# Patient Record
Sex: FEMALE | Race: WHITE | Employment: UNEMPLOYED | URBAN - METROPOLITAN AREA
[De-identification: names, ages, dates, MRNs, and addresses within clinical notes are randomized per-mention and may not be internally consistent; named-entity substitution may affect disease eponyms.]

---

## 2022-10-27 ENCOUNTER — HOSPITAL ENCOUNTER (OUTPATIENT)
Dept: PHYSICAL THERAPY | Age: 37
Discharge: HOME | End: 2022-10-27
Payer: OTHER GOVERNMENT

## 2022-10-27 PROCEDURE — 97162 PT EVAL MOD COMPLEX 30 MIN: CPT

## 2022-10-27 PROCEDURE — 97535 SELF CARE MNGMENT TRAINING: CPT

## 2022-10-27 PROCEDURE — 97530 THERAPEUTIC ACTIVITIES: CPT

## 2022-10-27 NOTE — PROGRESS NOTES
[facility] at the [address], [place], [address] [facility]  Phone: [phone]      Fax:  [phone]      Plan of Care/ Statement of Necessity for Physical Therapy Services      Patient name: Anaay English Start of Care: 10/27/2022   Referral source: Kari Fortune DO : 1985    Medical Diagnosis: Other symptoms and signs involving the genitourinary system [R39.89]   Onset Date:10/27/2012    Treatment Diagnosis:  Other symptoms and signs involving the genitourinary system [R39.89]   Prior Hospitalization: see medical history Provider#: [id number]   Medications: Verified on Patient summary List    Comorbidities: Pelvic organ prolapse, 4 vaginal deliveries   Prior Level of Function: functionally independent, no pain with sex, no difficulty with bowel movements, normal urinary urges      The Plan of Care and following information is based on the information from the initial evaluation. Assessment/ key information:  Patient is a 30year old female presenting to Physical Therapy with c/o urinary retention/decreased urinary urges, difficulty with defecation, perineal/pelvic pain, and tailbone pain which is limiting ability function at previous level. Patient has perinal pain complaints with long periods of standing/walking and pelvic pain with sexual intercourse. She has been diagnosed with pelvic organ prolapse (potentially bladder prolapse and rectocele, per patient) and was not adherent to pessary. Due to increased time to take thorough history and symptoms, an internal assessment is deferred until next visit. Patient presents with poor understanding of bladder and bowel anatomy/function and pelvic health. I feel patient would benefit from skilled therapeutic intervention to optimize highest functional level possible.    Evaluation Complexity History HIGH Complexity :3+ comorbidities / personal factors will impact the outcome/ POC ; Examination MEDIUM Complexity : 3 Standardized tests and measures addressing body structure, function, activity limitation and / or participation in recreation  ;Presentation MEDIUM Complexity : Evolving with changing characteristics  ; Clinical Decision Making MEDIUM Complexity : FOTO score of 26-74 FOTO score = an established functional score where 100 = no disability  Overall Complexity Rating: MEDIUM  Problem List: pain affecting function, decrease ROM, decrease strength, impaired gait/ balance, decrease ADL/ functional abilitiies, decrease activity tolerance, decrease flexibility/ joint mobility, decrease transfer abilities, and other pelvic pain and pressure affecting function    Treatment Plan may include any combination of the following: Therapeutic exercise, Neuromuscular reeducation, Manual therapy, Therapeutic activity, Self care/home management, and Other: pelvic floor physical therapy    Patient / Family readiness to learn indicated by: asking questions and interest  Persons(s) to be included in education: patient (P)  Barriers to Learning/Limitations: yes;  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 Status at eval: Patient consuming 80 oz water daily. Reports no urinary urges until seated on toilet and voiding, voiding about every 3 hours without urge because she thinks she should. 2) Bowel: Pt will demonstrate proper toileting posture/techniques for improved bowel motility to improve QOL and prevent weakening of PFM due to straining with BMs.   Status at eval: BM every day, Type 3-4, standard toileting posture with feet on stool, pain with BM and splinting every 1/7 days     3) Pain Patient will be able to use management tools/exercises to reduce pelvic pain/pressure following walk/exercise/childcare to less than VAS 3/10  Status at eval: being on feet and during transitions cause pelvic pain and tailbone pain 0-7/10     Long term goals: To be achieved in 16 weeks:  1) Urinary: Patient will report appropriate urinary urges every 3-4 hours with strong stream of 8-12 seconds on voiding based on fluid intake. Status at eval: Reports no urinary urges until seated on toilet and voiding, voiding about every 3 hours without urge because she thinks she should. 2) Pain: Patient will report ability to have sexual intercourse with  without limitation of positions from pain for emotional health and wellbeing. Status at eval: Patient reports modified sexual intercourse positions due to pelvic pain     3) Pain: Patient will report 0/10 tailbone pain with sit to stand transfers over 2-3 weeks for improved participation in childcare.               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 Status at eval : patient unsure of independent symptom management- no longer using pessary  Frequency / Duration: Patient to be seen 1 times per week for 16 weeks. Patient/ Caregiver education and instruction: Diagnosis, prognosis, self care, activity modification, and exercises   [x]  Plan of care has been reviewed with JER Durant, MATTHEW 10/27/2022 8:07 AM  _____________________________________________________________________  I certify that the above Therapy Services are being furnished while the patient is under my care. I agree with the treatment plan and certify that this therapy is necessary.     [de-identified] Signature:____________Date:_________TIME:________                                      Subha Sharif, DO    ** Signature, Date and Time must be completed for valid certification **    Please sign and return to [facility] at the [address], [address] [facility]       Phone: [phone]      Fax:  [phone]

## 2022-10-27 NOTE — PROGRESS NOTES
Physical Therapy Evaluation        Patient Name: Nadir Mack  Date:10/27/2022  : 1985  [x]  Patient  Verified  Payor:  / Plan: [employer] / Product Type:  /    In time:10:36  Out time:11:18  Total Treatment Time (min): 42  Visit #: 1 of 16    Medicare/BCBS Only   Total Timed Codes (min):  20 1:1 Treatment Time:  42       Treatment Area: Other symptoms and signs involving the genitourinary system [R39.89]    SUBJECTIVE  ny medication changes, allergies to medications, adverse drug reactions, diagnosis change, or new procedure performed?: [x] No    [] Yes (see summary sheet for update)  Subjective functional status/changes:     Current symptoms/Complaints: discomfort/difficulty with bowel symptoms requiring splinting and straining, pain with sexual intercourse and changing positions, decreased sensation to void/decreased urge  \"prolapse symptoms\"  Mechanism of Injury: after second (of 4) pregnanices; diagnosed with rectocele and cystocele/blader prolapse 2 children ago    PLOF: functionally independent, no pain with sex, no difficulty with bowel movements, normal urinary urges  Limitations to PLOF: pain with sex and has modified positions, splinting/ pain/ straining with bowel movements, pelvic/ perineal pain/pressure after standing/walking long periods of time, symptoms worse with cycle, tailbone pain with transitions, decreased urinary urge with worry for increased UTIs    Pain Location:   Pain Level (0-10 scale):  \"Tailbone pain\" insidiously, a few times a month and lasts a few days  []constant [x]intermittent []improving []worsening []no change since onset  Current: 0/10  Best: 0/10   Worst: 6-7/10 during when tailbone hurts with transitioning positions  Sleep: is not interrupted secondary to pain    Previous Treatment/Compliance: Tried pessary a few years ago and didn't like it, but open to it again due to using menstrual cup  Obstetrical History:5 pregnancies/ 4 children; all vaginal deliveries, possible tearing (2010- 2017)  PMHx/Surgical Hx: None  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  Living Situation:  and 4 children  Pt Goals: \"I don't know- less pain, improved bladder urges, improved sexual relations\"  Barriers: []pain []financial [x]time []transportation [x]Other: Mom of 4  Motivation: moderate  Substance use: []Alcohol []Tobacco []other:   Cognition: A & O x 3      Current urinary complaint  sensation of incomplete bladder emptying intermittently    Bladder complaint longevity:   10ish years    Bladder symptom progression:  Remaining the same    Pad use: none- no need    Pad wetness when changed: none    Daytime urinary frequency:  Every 3-4 hour(s) during the day    Nocturia:  0x/ night    Patient has failed previous pelvic floor muscle training? [] Yes    [x] No    Bowel function:  Some constipation and splinting, daily  Stool Type ([place]): 3-4    Toileting position/modifications: uses stool    Fecal Incontinence present? None    Diet: Average    Fluid intake:    Fluid  Amount per day   Water 80oz   Caffeine 1 bottle/1 can per day of Dr. Lashawn Conroy   Alcohol None             Physical Exam Objective Findings:    Pelvic Floor Assessment  Patient was educated on pelvic floor anatomy, structure, and function and implications for current presentation of s/s. Interna assessment was held today due to time to discuss extensive symptoms and history. 22 min [x]Eval                  []Re-Eval       10 min Self Care: Review and handout provided on the following: PFM anatomy, structure and function as it pertains to current s/s, complaints and condition. Reviewed expectations for PFPT and POC. Rationale:  Increase awareness and understanding of current condition to improve patients ability to independently and effectively attain goals and progress towards long term management of current condition.      10 min Therapeutic Activity:  [x]  See flow sheet : Education and handouts provided for bladder fitness, voiding schedule every 2 1/2 hours, toileting positions to improve bowel movements   Rationale: increase strength, improve coordination, and increase proprioception  to improve the patients ability to improve bladder/bowel fitness for health      Pain Level (0-10 scale) post treatment: 0    ASSESSMENT/Changes in Function: Patient is a 30year old female presenting to Physical Therapy with c/o urinary retention, difficulty with defecation, perineal/pelvic pain, and tailbone pain which is limiting ability function at previous level. Patient has perinal pain complaints with long periods of standing/walking and pelvic pain with sexual intercourse. She has been diagnosed with pelvic organ prolapse (potentially bladder prolapse and rectocele, per patient) and was not adherent to pessary. Due to increased time to take thorough history and symptoms, an internal assessment is deferred until next visit. Patient presents with poor understanding of bladder and bowel anatomy/function and pelvic health. I feel patient would benefit from skilled therapeutic intervention to optimize highest functional level possible. Patient will continue to benefit from skilled PT services to modify and progress therapeutic interventions, address functional mobility deficits, address ROM deficits, address strength deficits, analyze and address soft tissue restrictions, analyze and cue movement patterns, analyze and modify body mechanics/ergonomics, assess and modify postural abnormalities, address imbalance/dizziness, and instruct in home and community integration to attain remaining goals. [x]  See Plan of Care  []  See progress note/recertification  []  See Discharge Summary         Progress towards goals / Updated goals:  Short term goals:  To be achieved in 8 weeks:  1) Urinary: Patient will demonstrate proper fluid habits and normal voiding strategies that promote bladder health to aid in management of normalizing urinary urges and decrease risk of UTIs/ complications of bladder retention. Status at eval: Patient consuming 80 oz water daily. Reports no urinary urges until seated on toilet and voiding, voiding about every 3 hours without urge because she thinks she should. 2) Bowel: Pt will demonstrate proper toileting posture/techniques for improved bowel motility to improve QOL and prevent weakening of PFM due to straining with BMs. Status at eval: BM every day, Type 3-4, standard toileting posture with feet on stool, pain with BM and splinting every 1/7 days    3) Pain Patient will be able to use management tools/exercises to reduce pelvic pain/pressure following walk/exercise/childcare to less than VAS 3/10  Status at eval: being on feet and during transitions cause pelvic pain and tailbone pain 0-7/10    Long term goals: To be achieved in 16 weeks:  1) Urinary: Patient will report appropriate urinary urges every 3-4 hours with strong stream of 8-12 seconds on voiding based on fluid intake. Status at eval: Reports no urinary urges until seated on toilet and voiding, voiding about every 3 hours without urge because she thinks she should. 2) Pain: Patient will report ability to have sexual intercourse with  without limitation of positions from pain for emotional health and wellbeing. Status at eval: Patient reports modified sexual intercourse positions due to pelvic pain    3) Pain: Patient will report 0/10 tailbone pain with sit to stand transfers over 2-3 weeks for improved participation in childcare.    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    Status at eval : patient unsure of independent symptom management- no longer using pessary    PLAN  []  Upgrade activities as tolerated     [x]  Continue plan of care  []  Update interventions per flow sheet       []  Discharge due to:_  []  Other:_      Colvin Bosworth, PT 10/27/2022  10:13 AM

## 2022-11-11 ENCOUNTER — HOSPITAL ENCOUNTER (OUTPATIENT)
Dept: PHYSICAL THERAPY | Age: 37
End: 2022-11-11
Payer: OTHER GOVERNMENT

## 2022-11-17 ENCOUNTER — HOSPITAL ENCOUNTER (OUTPATIENT)
Dept: PHYSICAL THERAPY | Age: 37
Discharge: HOME | End: 2022-11-17
Payer: OTHER GOVERNMENT

## 2022-11-17 PROCEDURE — 97535 SELF CARE MNGMENT TRAINING: CPT

## 2022-11-17 PROCEDURE — 97112 NEUROMUSCULAR REEDUCATION: CPT

## 2022-11-17 PROCEDURE — 97530 THERAPEUTIC ACTIVITIES: CPT

## 2022-11-17 NOTE — PROGRESS NOTES
PF DAILY TREATMENT NOTE    Patient Name: Laura Hernandez  Date:2022  : 1985  [x]  Patient  Verified  Payor:  / Plan: [facility] / Product Type:  /    In time:10:30  Out time:11:15  Total Treatment Time (min): 45    For [facility] only  Total Timed Codes (min): 45  Of Timed Code minutes, 1:1 Treatment Time: 45     Visit #: 2 of 16    Treatment Area: Other symptoms and signs involving the genitourinary system [R39.89]    SUBJECTIVE  Pain Level (0-10 scale): 0/10  Any medication changes, allergies to medications, adverse drug reactions, diagnosis change, or new procedure performed?: [x] No    [] Yes (see summary sheet for update)  Subjective functional status/changes:   [x] No changes reported    Patient reports she has been trying to increase her urinary voiding interval. Has been very busy since her previous session.      OBJECTIVE       12 min Therapeutic Activity:  []  See flow sheet :    []  Increase Tissue extensibility        []  Assess fiber intake    []  Assess voiding habits  []  Assess bowel habits  []  Other:   Rationale: increase strength, improve coordination, and increase proprioception  to improve the patients ability to improve bladder/bowel fitness for health      25 min Neuromuscular Re-education:  []  See flow sheet :   [x]  Pelvic floor strengthening                 [x]  Pelvic floor downtraining  [x]  Quality pelvic floor contractions       [x]  Relaxation techniques  []  Urge suppression exercises  []  Other:  Rationale: improve coordination and increase proprioception  to improve the patients ability to patient's ability to participate in ADLs without limitation    8 min Self Care: Meditation, calming exercises, decrease attention on stress for CNS down regulation   Rationale:    increase proprioception to improve the patients ability to decrease pelvic floor muscle pain and tension for improved ADL participation         With   [] TE   [] TA   [] neuro  [] manual  [] self care   [] other: Patient Education: [x] Review HEP    [] Progressed/Changed HEP based on:   [] positioning   [] body mechanics   [] transfers   [] heat/ice application    [] other:      Other Objective/Functional Measures:   Pelvic Floor Assessment  Patient was educated on pelvic floor anatomy, structure, and function and implications for current presentation of s/s. Patient consents to pelvic floor assessment.        External trigger point/ muscle tenderness:    Superficial PFM tenderness/restriction: (Bold is present finding)   RIGHT Bulbocavernosus (bulbospongiosus)  LEFT Bulbocavernosus (bulbospongiosus)   RIGHT Ischiocavernosus    LEFT Ischiocavernosus   RIGHT Superficial Transverse Perineal  LEFT Superficial Transverse Perineal   Perineal Body   None      Skin Integrity:  [x] Healthy [] Red  [] Labia Atrophy [] Fragile    Sensation: [x] Intact [] Diminished:    PFM Screen:    Muscle Bulk: [] Symmetrical  [] Well-developed [x] Atrophied:  []L   []R   [x]B    Ability to perform PFM contraction: weak  Ability to actively bulge: weak  Bulge with cough present  Bulge absent with knack prior to cough    Pelvic floor manual exam: Performed via internal digital vaginal assessment  female-upon vaginal palpation of the pelvic floor, the following was noted: general vulvar laxity  Introitus restriction/TTP (reported as hands on a clock): None  No scar tissue restriction present    Deep PFM Tenderness/Restriction: (Bold is present finding)   RIGHT pubococcygeus LEFT pubococcygeus   RIGHT Ischiococcygeus LEFT Ischiococcygeus   RIGHT liococcygeus  LEFT liococcygeus   RIGHT Obturator Internus LEFT Obturator Internus   COCCYX   NONE    Pelvic floor MMT    PERF (Performance/Endurance/Repetitions//Flicks): 7/18/2//2  Gluteal, adductor substitution noted    Prolapse: [x] Cystocele:   [] Rectocele:  [] Uterine prolapse:        Pain Level (0-10 scale) post treatment: 0/10    ASSESSMENT/Changes in Function: Patient consented to internal PF muscle assessment this session. Of significance from assessment, patient demonstrates difficulty coordinating PFM contraction; increased tone and PTP of levator ani, iliococcygeus, and OI muscles bilaterally; weak PFMs with reduced endurance; and visualized anterior prolapse. Patient was educated on findings. Based on findings, patient was educated on need to reduce pain and tone prior to strengthening to address prolapse symptoms. She was educated on CNS downregulation, deep breathing, and meditation to assist. She was educated on importance of pelvic floor awareness to manage tone and given HEP to improve awareness for ability to perform stretching and interventions in next sessions. Patient will benefit from PF stretching, internal/ vaginal  work to manage muscle tone in next few sessions. Patient will continue to benefit from skilled PT services to modify and progress therapeutic interventions, address functional mobility deficits, address ROM deficits, address strength deficits, analyze and address soft tissue restrictions, analyze and cue movement patterns, analyze and modify body mechanics/ergonomics, assess and modify postural abnormalities, address imbalance/dizziness, and instruct in home and community integration to attain remaining goals. [x]  See Plan of Care  []  See progress note/recertification  []  See Discharge Summary         Progress towards goals / Updated goals:  Short term goals: To be achieved in 8 weeks:  1) Urinary: Patient will demonstrate proper fluid habits and normal voiding strategies that promote bladder health to aid in management of normalizing urinary urges and decrease risk of UTIs/ complications of bladder retention. Status at eval: Patient consuming 80 oz water daily. Reports no urinary urges until seated on toilet and voiding, voiding about every 3 hours without urge because she thinks she should.      2) Bowel: Pt will demonstrate proper toileting posture/techniques for improved bowel motility to improve QOL and prevent weakening of PFM due to straining with BMs. Status at eval: BM every day, Type 3-4, standard toileting posture with feet on stool, pain with BM and splinting every 1/7 days     3) Pain Patient will be able to use management tools/exercises to reduce pelvic pain/pressure following walk/exercise/childcare to less than VAS 3/10  Status at eval: being on feet and during transitions cause pelvic pain and tailbone pain 0-7/10     Long term goals: To be achieved in 16 weeks:  1) Urinary: Patient will report appropriate urinary urges every 3-4 hours with strong stream of 8-12 seconds on voiding based on fluid intake. Status at eval: Reports no urinary urges until seated on toilet and voiding, voiding about every 3 hours without urge because she thinks she should. 2) Pain: Patient will report ability to have sexual intercourse with  without limitation of positions from pain for emotional health and wellbeing. Status at eval: Patient reports modified sexual intercourse positions due to pelvic pain     3) Pain: Patient will report 0/10 tailbone pain with sit to stand transfers over 2-3 weeks for improved participation in childcare.               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 Status at eval : patient unsure of independent symptom management- no longer using pessary    NEW GOAL: to be completed with long term goals in 16 weeks from start of care:   Patient's PERF score will improve to 4/10/8//10 at least for ability ot participate in all childcare and extracurricular activities.    Status on 11/17/22: PERF (Performance/Endurance/Repetitions//Flicks): 4/30/3//8    PLAN  []  Upgrade activities as tolerated     [x]  Continue plan of care  []  Update interventions per flow sheet       []  Discharge due to:_  []  Other:_      José Alberto, PT 11/17/2022  9:36 AM    Future Appointments   Date Time Provider Adolfo Quinones   11/17/2022 10:30 AM Leroy Osuna PT [facility]   12/1/2022  9:00 AM Leroy Osuna PT [facility]   12/6/2022  9:00 AM Leroy Osuna PT [facility]   12/13/2022  9:45 AM Leroy Osuna PT [facility]   12/20/2022  9:00 AM Leroy Osuna PT [facility]   12/27/2022  9:00 AM Leroychandrika Osuna PT [facility]

## 2022-12-01 ENCOUNTER — HOSPITAL ENCOUNTER (OUTPATIENT)
Dept: PHYSICAL THERAPY | Age: 37
Discharge: HOME | End: 2022-12-01
Payer: OTHER GOVERNMENT

## 2022-12-01 PROCEDURE — 97530 THERAPEUTIC ACTIVITIES: CPT

## 2022-12-01 PROCEDURE — 97535 SELF CARE MNGMENT TRAINING: CPT

## 2022-12-01 PROCEDURE — 97110 THERAPEUTIC EXERCISES: CPT

## 2022-12-01 PROCEDURE — 97112 NEUROMUSCULAR REEDUCATION: CPT

## 2022-12-01 NOTE — PROGRESS NOTES
PT DAILY TREATMENT NOTE    Patient Name: Megan Heredia  Date:2022  : 1985  [x]  Patient  Verified  Payor:  / Plan: [facility] 76 / Product Type:  /    In time:9:45am  Out time:10:35am  Total Treatment Time (min): 50  Total Timed Codes (min): 50  1:1 Treatment Time ([facility] only): NA   Visit #: 3 of 16    Treatment Dx: Other symptoms and signs involving the genitourinary system [R39.89]    SUBJECTIVE  Pain Level (0-10 scale): 0  Any medication changes, allergies to medications, adverse drug reactions, diagnosis change, or new procedure performed?: [x] No    [] Yes (see summary sheet for update)  Subjective functional status/changes:   [] No changes reported   Pt states that she has been working on relaxing her pelvic floor muscles but having trouble paying attention. She is working on going to urinate more regularly and feeling partial increased sense of urge to go. Still having intermittent tailbone pain with STS tranfers. Cold weather makes it worse. Squatting is painful; her back hurts.      OBJECTIVE       8 min Therapeutic Exercise:  [x] See flow sheet :   Rationale: increase ROM, increase strength, improve coordination, improve balance, and increase proprioception to improve the patients ability to perform daily activities with decreased pain and symptom levels        17 min Therapeutic Activity:  [x]  See flow sheet : see edu below   Rationale: increase ROM, increase strength, improve coordination, improve balance, and increase proprioception  to improve the patients ability to perform daily activities with decreased pain and symptom levels        15 min Neuromuscular Re-education:  [x]  See flow sheet : postural re-education and breathing mechanics with tactile and verbal cueing   Rationale: increase ROM, increase strength, improve coordination, improve balance, and increase proprioception  to improve the patients ability to perform daily activities with decreased pain and symptom levels    10 min Self Care:  Edu re: mindfulness techniques to improve awareness of PFM, positioning strategies to reduce PFM tone, abdominal canister anatomy    Rationale: to improve the patients health literacy and ability to self manage symptoms outside of physical therapy           With   [] TE   [x] TA   [] neuro   [] other: Patient Education: [x] Review HEP    [] Progressed/Changed HEP based on:   [x] positioning   [x] body mechanics   [] transfers   [] heat/ice application    [x] other: progress made, remaining impairments, POC, attendance, ortho assessment and findings     Other Objective/Functional Measures:   SLS: trendelenburg (right > left)  Squatting: right lateral shift, excessive anterior pelvic tilt, poor lumbopelvic dissociation, limited depth, LBP! Posture: forward head, rounded shoulders, limited overhead mobility    Breathing pattern: excessive anterior rib cage expansion, decreased posterior wall expansion    Pain Level (0-10 scale) post treatment: 0    ASSESSMENT/Changes in Function:  Pt has attended PT for 3 visits to address tailbone pain, perineal pressure, and bowel movement dysfunction. She demos progress in health literacy regarding healthy bladder and bowel habits. Barriers to progress include limited attendance; patient states that barriers to attendance have been addressed; anticipate more progress with increased frequency of visits. See updated goals for details    Observed poor lumbopelvic dissociation limiting patients ability to squat to full depth without pain and limitation. Observed postural abnormalities including excessive APT and rounded shoulders, which contribute to excess strain on PFM and previous finding of increased PFM tone. Progressed HEP to better address postural and mobility impairments that contribute to pelvic floor dysfunction.      Patient will continue to benefit from skilled PT services to modify and progress therapeutic interventions, address functional mobility deficits, address ROM deficits, address strength deficits, analyze and address soft tissue restrictions, analyze and cue movement patterns, analyze and modify body mechanics/ergonomics, assess and modify postural abnormalities, address imbalance/dizziness, and instruct in home and community integration to attain remaining goals. [x]  See Plan of Care  [x]  See progress note/recertification  []  See Discharge Summary         Progress towards goals / Updated goals:  Short term goals: To be achieved in 8 weeks:  1) Urinary: Patient will demonstrate proper fluid habits and normal voiding strategies that promote bladder health to aid in management of normalizing urinary urges and decrease risk of UTIs/ complications of bladder retention. Status at eval: Patient consuming 80 oz water daily. Reports no urinary urges until seated on toilet and voiding, voiding about every 3 hours without urge because she thinks she should. PN Status 12/1/22: patient reports that she is working on voiding every 2.5 hours progressing     2) Bowel: Pt will demonstrate proper toileting posture/techniques for improved bowel motility to improve QOL and prevent weakening of PFM due to straining with BMs. Status at eval: BM every day, Type 3-4, standard toileting posture with feet on stool, pain with BM and splinting every 1/7 days  PN Status 12/1/22: pt reports no change thus far      3) Pain Patient will be able to use management tools/exercises to reduce pelvic pain/pressure following walk/exercise/childcare to less than VAS 3/10  Status at eval: being on feet and during transitions cause pelvic pain and tailbone pain 0-7/10  PN Status 12/1/22: progressed HEP to address postural impairments contributing to pain progressing     Long term goals:  To be achieved in 16 weeks:  1) Urinary: Patient will report appropriate urinary urges every 3-4 hours with strong stream of 8-12 seconds on voiding based on fluid intake. Status at eval: Reports no urinary urges until seated on toilet and voiding, voiding about every 3 hours without urge because she thinks she should. PN Status 12/1/22: patient reports that she is working on voiding every 2.5 hours progressing     2) Pain: Patient will report ability to have sexual intercourse with  without limitation of positions from pain for emotional health and wellbeing. Status at eval: Patient reports modified sexual intercourse positions due to pelvic pain  PN Status 12/1/22: not addressed today     3) Pain: Patient will report 0/10 tailbone pain with sit to stand transfers over 2-3 weeks for improved participation in childcare.               Status at eval: tailbone pain 0-7/10 during transitional movements lasting a few days at a time about 3 episodes per month  PN Status 12/1/22: progressed HEP to address postural impairments contributing to pain progressing     4) Bowel: Patient will demonstrate proper pelvic floor and diaphragm coordination to aide in proper evacuation of stool without pain or splinting  Status at eval: Patient has pain and requires splinting 1/7 days  PN Status 12/1/22: pt denies changes in these sxs thus far     5) Pain: Patient will tolerate advanced ADLs including social, childcare, and sporting activities with pelvic pain/pressure no more than 1/10  Status at eval: 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 Status at eval : patient unsure of independent symptom management- no longer using pessary  PN Status 12/1/22: progressed HEP and patient education to improve understanding of symptom management progressing     NEW GOAL: to be completed with long term goals in 16 weeks from start of care:              Patient's PERF score will improve to 4/10/8//10 at least for ability ot participate in all childcare and extracurricular activities.               Status on 11/17/22: PERF (Performance/Endurance/Repetitions//Flicks): 0/24/5//3   PN Status 12/1/22: not reassessed today; progressed exercises to reduce PFM tone progressing       PLAN  []  Upgrade activities as tolerated     [x]  Continue plan of care  []  Update interventions per flow sheet       []  Discharge due to:_  []  Other:_      Raquel Funk, PT 12/1/2022  9:27 AM    Future Appointments   Date Time Provider Adolfo Quinones   12/1/2022  9:45 AM Anna Lock, PT [facility]   12/13/2022  9:45 AM MATTHEW StarrHPROGER [facility]   12/20/2022  9:00 AM MATTHEW StarrHPROGER [facility]   12/27/2022  9:00 AM MATTHEW StarrHPROGER [facility]

## 2022-12-01 NOTE — PROGRESS NOTES
[facility] at [facility] [address], [place], [address] [facility]  Phone: [phone]      Fax:  [phone]    Progress Note  Patient name: Otilia Soulier Start of Care: 10/27/2022   Referral source: Migue Torres DO : 1985               Medical Diagnosis: Other symptoms and signs involving the genitourinary system [R39.89]    Onset Date:10/27/2012               Treatment Diagnosis:  Other symptoms and signs involving the genitourinary system [R39.89]   Prior Hospitalization: see medical history Provider#: [id number]   Medications: Verified on Patient summary List    Comorbidities: Pelvic organ prolapse, 4 vaginal deliveries   Prior Level of Function: functionally independent, no pain with sex, no difficulty with bowel movements, normal urinary urges    Visits from Start of Care: 3    Missed Visits: 2    Progress Towards Goals:   Short term goals: To be achieved in 8 weeks:  1) Urinary: Patient will demonstrate proper fluid habits and normal voiding strategies that promote bladder health to aid in management of normalizing urinary urges and decrease risk of UTIs/ complications of bladder retention. Status at eval: Patient consuming 80 oz water daily. Reports no urinary urges until seated on toilet and voiding, voiding about every 3 hours without urge because she thinks she should. PN Status 22: patient reports that she is working on voiding every 2.5 hours progressing     2) Bowel: Pt will demonstrate proper toileting posture/techniques for improved bowel motility to improve QOL and prevent weakening of PFM due to straining with BMs.   Status at eval: BM every day, Type 3-4, standard toileting posture with feet on stool, pain with BM and splinting every 1/7 days  PN Status 22: pt reports no change thus far      3) Pain Patient will be able to use management tools/exercises to reduce pelvic pain/pressure following walk/exercise/childcare to less than VAS 3/10  Status at eval: being on feet and during transitions cause pelvic pain and tailbone pain 0-7/10  PN Status 12/1/22: progressed HEP to address postural impairments contributing to pain progressing     Long term goals: To be achieved in 16 weeks:  1) Urinary: Patient will report appropriate urinary urges every 3-4 hours with strong stream of 8-12 seconds on voiding based on fluid intake. Status at eval: Reports no urinary urges until seated on toilet and voiding, voiding about every 3 hours without urge because she thinks she should. PN Status 12/1/22: patient reports that she is working on voiding every 2.5 hours progressing     2) Pain: Patient will report ability to have sexual intercourse with  without limitation of positions from pain for emotional health and wellbeing. Status at eval: Patient reports modified sexual intercourse positions due to pelvic pain  PN Status 12/1/22: not addressed today     3) Pain: Patient will report 0/10 tailbone pain with sit to stand transfers over 2-3 weeks for improved participation in childcare.               Status at eval: tailbone pain 0-7/10 during transitional movements lasting a few days at a time about 3 episodes per month  PN Status 12/1/22: progressed HEP to address postural impairments contributing to pain progressing     4) Bowel: Patient will demonstrate proper pelvic floor and diaphragm coordination to aide in proper evacuation of stool without pain or splinting  Status at eval: Patient has pain and requires splinting 1/7 days  PN Status 12/1/22: pt denies changes in these sxs thus far     5) Pain: Patient will tolerate advanced ADLs including social, childcare, and sporting activities with pelvic pain/pressure no more than 1/10  Status at eval: 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 Status at eval : patient unsure of independent symptom management- no longer using pessary  PN Status 12/1/22: progressed HEP and patient education to improve understanding of symptom management progressing     NEW GOAL: to be completed with long term goals in 16 weeks from start of care:              Patient's PERF score will improve to 4/10/8//10 at least for ability ot participate in all childcare and extracurricular activities. Status on 11/17/22: PERF (Performance/Endurance/Repetitions//Flicks): 9/84/6//0              PN Status 12/1/22: not reassessed today; progressed exercises to reduce PFM tone progressing    Key Functional Changes:   Pt has attended PT for 3 visits to address tailbone pain, perineal pressure, and bowel movement dysfunction. She demos progress in health literacy regarding healthy bladder and bowel habits. Barriers to progress include limited attendance; patient states that barriers to attendance have been addressed; anticipate more progress with increased frequency of visits. See updated goals for details     Observed poor lumbopelvic dissociation limiting patients ability to squat to full depth without pain and limitation. Observed postural abnormalities including excessive APT and rounded shoulders, which contribute to excess strain on PFM and previous finding of increased PFM tone. Progressed HEP to better address postural and mobility impairments that contribute to pelvic floor dysfunction.       Patient will continue to benefit from skilled PT services to modify and progress therapeutic interventions, address functional mobility deficits, address ROM deficits, address strength deficits, analyze and address soft tissue restrictions, analyze and cue movement patterns, analyze and modify body mechanics/ergonomics, assess and modify postural abnormalities, address imbalance/dizziness, and instruct in home and community integration to attain remaining goals. Updated Goals: to be achieved in 13 treatments:   See above  ASSESSMENT/RECOMMENDATIONS:  [x]Continue therapy per initial plan/protocol at a frequency of  1 x per week for 16 weeks  []Continue therapy with the following recommended changes:_____________________      _____________________________________________________________________  []Discontinue therapy progressing towards or have reached established goals  []Discontinue therapy due to lack of appreciable progress towards goals  []Discontinue therapy due to lack of attendance or compliance  []Await Physician's recommendations/decisions regarding therapy  []Other:________________________________________________________________    Thank you for this referral.   Thomas Frederick, PT 12/1/2022 9:31 AM  NOTE TO PHYSICIAN:  Tanisha V[address]la [address]   FAX TO [facility]: [phone]  If you are unable to process this request in 24 hours please contact our office: [phone]        []  I have read the above report and request that my patient continue as recommended. []  I have read the above report and request that my patient continue therapy with the following changes/special instructions:________________________________________  []I have read the above report and request that my patient be discharged from therapy.     [de-identified] Signature:____________Date:_________TIME:________                                      Hasmukh Cochran DO      ** Signature, Date and Time must be completed for valid certification **

## 2022-12-06 ENCOUNTER — APPOINTMENT (OUTPATIENT)
Dept: PHYSICAL THERAPY | Age: 37
End: 2022-12-06
Payer: OTHER GOVERNMENT

## 2022-12-13 ENCOUNTER — HOSPITAL ENCOUNTER (OUTPATIENT)
Dept: PHYSICAL THERAPY | Age: 37
Discharge: HOME | End: 2022-12-13
Payer: OTHER GOVERNMENT

## 2022-12-13 PROCEDURE — 97530 THERAPEUTIC ACTIVITIES: CPT

## 2022-12-13 PROCEDURE — 97535 SELF CARE MNGMENT TRAINING: CPT

## 2022-12-13 PROCEDURE — 97112 NEUROMUSCULAR REEDUCATION: CPT

## 2022-12-13 PROCEDURE — 97110 THERAPEUTIC EXERCISES: CPT

## 2022-12-13 NOTE — PROGRESS NOTES
PT DAILY TREATMENT NOTE    Patient Name: Leyla Christiansen  Date:2022  : 1985  [x]  Patient  Verified  Payor:  / Plan: Rex Hammonds 74 / Product Type:  /    In time:9:45  Out time:10:30  Total Treatment Time (min): 45  Total Timed Codes (min): 45  1:1 Treatment Time ([employer] only): 45   Visit #: 4 of 16    Treatment Dx: Other symptoms and signs involving the genitourinary system [R39.89]    SUBJECTIVE  Pain Level (0-10 scale): 0  Any medication changes, allergies to medications, adverse drug reactions, diagnosis change, or new procedure performed?: [x] No    [] Yes (see summary sheet for update)  Subjective functional status/changes:   [] No changes reported    Patient reports symptoms are pretty much the same. Has been performing HEP at least every other day.     OBJECTIVE       8 min Therapeutic Exercise:  [x] See flow sheet :   Rationale: increase ROM, increase strength, improve coordination, improve balance, and increase proprioception to improve the patients ability to perform daily activities with decreased pain and symptom levels        10 min Therapeutic Activity:  [x]  See flow sheet :    Rationale: increase ROM, increase strength, improve coordination, improve balance, and increase proprioception  to improve the patients ability to perform daily activities with decreased pain and symptom levels        15 min Neuromuscular Re-education:  [x]  See flow sheet :    Rationale: increase ROM, increase strength, improve coordination, improve balance, and increase proprioception  to improve the patients ability to perform daily activities with decreased pain and symptom levels     12 min Self Care:  Edu re:, positioning strategies to reduce PFM tone, abdominal canister anatomy and relation to back pain/ compensatory postural habits   Rationale: to improve the patients health literacy and ability to self manage symptoms outside of physical therapy          With   [] TE   [] TA [] neuro   [] other: Patient Education: [x] Review HEP    [] Progressed/Changed HEP based on:   [] positioning   [] body mechanics   [] transfers   [] heat/ice application    [] other:      Other Objective/Functional Measures: see goals     Pain Level (0-10 scale) post treatment: 0/10    ASSESSMENT/Changes in Function: Patient heavily educated on APT in sitting effects on pressure management system relating to pelvic pressure and low back pain. Patient c/o low back pain with excessice PPT, but improved response with education to PPT about 5-10% from resting APT. Tolerated exercises and application of PPT without back pain. Will progress as symptoms improve. Also educated on tailbone desensitization. Patient will continue to benefit from skilled PT services to modify and progress therapeutic interventions, address functional mobility deficits, address ROM deficits, address strength deficits, analyze and address soft tissue restrictions, analyze and cue movement patterns, analyze and modify body mechanics/ergonomics, assess and modify postural abnormalities, address imbalance/dizziness, and instruct in home and community integration to attain remaining goals. [x]  See Plan of Care  [x]  See progress note/recertification  []  See Discharge Summary         Progress towards goals / Updated goals:  Short term goals: To be achieved in 8 weeks:  1) Urinary: Patient will demonstrate proper fluid habits and normal voiding strategies that promote bladder health to aid in management of normalizing urinary urges and decrease risk of UTIs/ complications of bladder retention. Status at eval: Patient consuming 80 oz water daily. Reports no urinary urges until seated on toilet and voiding, voiding about every 3 hours without urge because she thinks she should.   PN Status 12/1/22: patient reports that she is working on voiding every 2.5 hours progressing     2) Bowel: Pt will demonstrate proper toileting posture/techniques for improved bowel motility to improve QOL and prevent weakening of PFM due to straining with BMs. Status at eval: BM every day, Type 3-4, standard toileting posture with feet on stool, pain with BM and splinting every 1/7 days  PN Status 12/1/22: pt reports no change thus far      3) Pain Patient will be able to use management tools/exercises to reduce pelvic pain/pressure following walk/exercise/childcare to less than VAS 3/10  Status at eval: being on feet and during transitions cause pelvic pain and tailbone pain 0-7/10  PN Status 12/13/22: progressed HEP and education to address postural impairments contributing to pain progressing     Long term goals: To be achieved in 16 weeks:  1) Urinary: Patient will report appropriate urinary urges every 3-4 hours with strong stream of 8-12 seconds on voiding based on fluid intake. Status at eval: Reports no urinary urges until seated on toilet and voiding, voiding about every 3 hours without urge because she thinks she should. PN Status 12/1/22: patient reports that she is working on voiding every 2.5 hours progressing     2) Pain: Patient will report ability to have sexual intercourse with  without limitation of positions from pain for emotional health and wellbeing. Status at eval: Patient reports modified sexual intercourse positions due to pelvic pain  PN Status 12/1/22: not addressed today     3) Pain: Patient will report 0/10 tailbone pain with sit to stand transfers over 2-3 weeks for improved participation in childcare.               Status at eval: tailbone pain 0-7/10 during transitional movements lasting a few days at a time about 3 episodes per month  PN Status 12/1/22: progressed HEP to address postural impairments contributing to pain progressing     4) Bowel: Patient will demonstrate proper pelvic floor and diaphragm coordination to aide in proper evacuation of stool without pain or splinting  Status at eval: Patient has pain and requires splinting 1/7 days  PN Status 12/1/22: pt denies changes in these sxs thus far     5) Pain: Patient will tolerate advanced ADLs including social, childcare, and sporting activities with pelvic pain/pressure no more than 1/10  Status at eval: 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 Status at eval : patient unsure of independent symptom management- no longer using pessary  PN Status 12/1/22: progressed HEP and patient education to improve understanding of symptom management progressing     NEW GOAL: to be completed with long term goals in 16 weeks from start of care:              Patient's PERF score will improve to 4/10/8//10 at least for ability ot participate in all childcare and extracurricular activities.               Status on 11/17/22: PERF (Performance/Endurance/Repetitions//Flicks): 6/43/6//4              PN Status 12/1/22: not reassessed today; progressed exercises to reduce PFM tone progressing    PLAN  []  Upgrade activities as tolerated     [x]  Continue plan of care  []  Update interventions per flow sheet       []  Discharge due to:_  []  Other:_      Angela López, PT 12/13/2022  8:05 AM    Future Appointments   Date Time Provider [place]   12/13/2022  9:45 AM Sheron Velazco, PT [facility]   12/22/2022 11:15 AM Lizeth Rivers, PT [facility]   12/27/2022  9:00 AM MATTHEW Bro [facility]

## 2022-12-20 ENCOUNTER — APPOINTMENT (OUTPATIENT)
Dept: PHYSICAL THERAPY | Age: 37
End: 2022-12-20
Payer: OTHER GOVERNMENT

## 2022-12-22 ENCOUNTER — APPOINTMENT (OUTPATIENT)
Dept: PHYSICAL THERAPY | Age: 37
End: 2022-12-22
Payer: OTHER GOVERNMENT

## 2022-12-27 ENCOUNTER — APPOINTMENT (OUTPATIENT)
Dept: PHYSICAL THERAPY | Age: 37
End: 2022-12-27
Payer: OTHER GOVERNMENT

## 2023-01-03 ENCOUNTER — APPOINTMENT (OUTPATIENT)
Dept: PHYSICAL THERAPY | Age: 38
End: 2023-01-03

## 2023-01-10 ENCOUNTER — APPOINTMENT (OUTPATIENT)
Dept: PHYSICAL THERAPY | Age: 38
End: 2023-01-10

## 2023-01-10 ENCOUNTER — HOSPITAL ENCOUNTER (OUTPATIENT)
Dept: PHYSICAL THERAPY | Age: 38
Discharge: HOME | End: 2023-01-10
Payer: OTHER GOVERNMENT

## 2023-01-10 PROCEDURE — 97530 THERAPEUTIC ACTIVITIES: CPT

## 2023-01-10 PROCEDURE — 97535 SELF CARE MNGMENT TRAINING: CPT

## 2023-01-10 PROCEDURE — 97112 NEUROMUSCULAR REEDUCATION: CPT

## 2023-01-10 NOTE — PROGRESS NOTES
[facility]  [address] [place], [address] [facility]  Ph [phone]  Fx [phone]    Physical Therapy Progress Note  Patient name: Nichole Holcomb Start of Care: 10/27/2022   Referral source: Darline Linder DO : 1985               Medical Diagnosis: Other symptoms and signs involving the genitourinary system [R39.89]    Onset Date:10/27/2012               Treatment Diagnosis:  Other symptoms and signs involving the genitourinary system [R39.89]   Prior Hospitalization: see medical history Provider#: [id number]   Medications: Verified on Patient summary List    Comorbidities: Pelvic organ prolapse, 4 vaginal deliveries   Prior Level of Function: functionally independent, no pain with sex, no difficulty with bowel movements, normal urinary urges     Visits from Start of Care: 3                                      Missed Visits: 2    Summary of Care/ Key Functional Changes:  Patient has completed 5 Physical Therapy visits for c/o urinary retention/decreased urinary urges, difficulty with defecation, perineal/pelvic pain, and tailbone pain which is limiting ability function at previous level. Patient has perinal pain complaints with long periods of standing/walking and pelvic pain with sexual intercourse. She has been diagnosed with pelvic organ prolapse (potentially bladder prolapse and rectocele, per patient) and was not adherent to pessary. Patient reports her tailbone bone symptoms and prolapse symptoms have improved over last few weeks likely due to change in scheule from holidays and decrease in performance of activities that contribute to symptoms. Patinet does report improvement in urinary urgency and bowel/ constipation management to restore PLOF. Patient continues to demonstrate deficits in voiding urges, constipation, and pelvic pain/pressure, all of which are improving. Patient is making progress towards goals.  Patient will benefit from continued skilled Physical Therapy intervention to address remaining deficits and achieve goals to maximize function. Updated Goals/Measure of Progress: TProgress towards goals / Updated goals:  Short term goals: To be achieved in 8 weeks:  1) Urinary: Patient will demonstrate proper fluid habits and normal voiding strategies that promote bladder health to aid in management of normalizing urinary urges and decrease risk of UTIs/ complications of bladder retention. Status at eval: Patient consuming 80 oz water daily. Reports no urinary urges until seated on toilet and voiding, voiding about every 3 hours without urge because she thinks she should. PN Status 12/1/22: patient reports that she is working on voiding every 2.5 hours progressing  Current: 1/10/23: continues to work on a voiding habits, but schedule was off during last weeks due to holidays     2) Bowel: Pt will demonstrate proper toileting posture/techniques for improved bowel motility to improve QOL and prevent weakening of PFM due to straining with BMs. Status at eval: BM every day, Type 3-4, standard toileting posture with feet on stool, pain with BM and splinting every 1/7 days  PN Status 12/1/22: pt reports no change thus far   Current: 1/10/23: patient reports is depends on what she eats, has decreased amount of straining- still uses splinting, educated on shifting in 3D planes to assist in evacuation and with breathing     3) Pain Patient will be able to use management tools/exercises to reduce pelvic pain/pressure following walk/exercise/childcare to less than VAS 3/10  Status at eval: being on feet and during transitions cause pelvic pain and tailbone pain 0-7/10  PN Status 12/13/22: progressed HEP and education to address postural impairments contributing to pain progressing  Current: 1/10/23: tailbone pain has been feeling better due to  change in routine over holidays     Long term goals:  To be achieved in 16 weeks:  1) Urinary: Patient will report appropriate urinary urges every 3-4 hours with strong stream of 8-12 seconds on voiding based on fluid intake. Status at eval: Reports no urinary urges until seated on toilet and voiding, voiding about every 3 hours without urge because she thinks she should. PN Status 12/1/22: patient reports that she is working on voiding every 2.5 hours progressing  Current: 1/10/23: continues to work on a voiding habits, but schedule was off during last weeks due to holidays     2) Pain: Patient will report ability to have sexual intercourse with  without limitation of positions from pain for emotional health and wellbeing. Status at eval: Patient reports modified sexual intercourse positions due to pelvic pain  PN Status 12/1/22: not addressed today  PN Status 1/10/23: not addressed today     3) Pain: Patient will report 0/10 tailbone pain with sit to stand transfers over 2-3 weeks for improved participation in childcare.               Status at eval: tailbone pain 0-7/10 during transitional movements lasting a few days at a time about 3 episodes per month  PN Status 12/1/22: progressed HEP to address postural impairments contributing to pain progressing  Current: 1/10/23: tailbone pain has been feeling better due to  change in routine over holidays     4) Bowel: Patient will demonstrate proper pelvic floor and diaphragm coordination to aide in proper evacuation of stool without pain or splinting  Status at eval: Patient has pain and requires splinting 1/7 days  PN Status 12/1/22: pt denies changes in these sxs thus far  Current: 1/10/23: patient educates on strategies this session     5) Pain: Patient will tolerate advanced ADLs including social, childcare, and sporting activities with pelvic pain/pressure no more than 1/10  Status at eval: 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  PN Status 12/1/22: pt denies changes in these sxs thus far  Current 1/10/23: patient's schedule has bene off due to holidays     6) Patient will demonstrate improvement of current complaints evidenced by a 9/4 point  (Bowel/ Urinary Problem) improvement in FOTO,   Status at Eval: (Bowel/ Urinary Problem) : 52/79  PN Status 12/1/22: will reassess at 5th visit  Current 1/10/23: will re-assess next visit     7) Patient will demonstrate independence with management tools and exercise program that are beneficial for current condition in order to feel comfortable with Pelvic floor PT D/C and not fear exacerbation of current condition or symptoms returning. Status at eval : patient unsure of independent symptom management- no longer using pessary  PN Status 12/1/22: progressed HEP and patient education to improve understanding of symptom management progressing  Current 1/10/23: progressed HEP and patient education to improve understanding of symptom management progressing     NEW GOAL: to be completed with long term goals in 16 weeks from start of care:              Patient's PERF score will improve to 4/10/8//10 at least for ability ot participate in all childcare and extracurricular activities.               Status on 11/17/22: PERF (Performance/Endurance/Repetitions//Flicks): 7/03/4//2              PN Status 12/1/22: not reassessed today; progressed exercises to reduce PFM tone progressing   Current 1/10/23: patient reports increased awareness and easier relaxation of pelvic floor muscles    ASSESSMENT/RECOMMENDATIONS:  [x]Continue therapy per initial plan/protocol at a frequency of  1 x per week for 16 weeks    Thank you for this referral.   Deb Peck, PT 1/10/2023 9:17 AM

## 2023-01-10 NOTE — PROGRESS NOTES
PF DAILY TREATMENT NOTE    Patient Name: Juna You  Date:1/10/2023  : 1985  [x]  Patient  Verified  Payor:  / Plan: [facility]  [facility] / Product Type:  /    In time: 10:35  Out time:11:30  Total Treatment Time (min): 55    For [facility] only  Total Timed Codes (min): 55  Of Timed Code minutes, 1:1 Treatment Time: 55     Visit #: 5 of 16    Treatment Area: Other symptoms and signs involving the genitourinary system [R39.89]    SUBJECTIVE  Pain Level (0-10 scale): 0  Any medication changes, allergies to medications, adverse drug reactions, diagnosis change, or new procedure performed?: [x] No    [] Yes (see summary sheet for update)  Subjective functional status/changes:   [] No changes reported    Patient reports she is menstuating and her body is not feeling good. Reports she has been working on changing her pelvic postiong away from anterior positioing, but because of holiday break has not been doing her normal activities. OBJECTIVE       20 min Therapeutic Activity:  []  See flow sheet :    []  Increase Tissue extensibility        [x]  Assess fiber intake    [x]  Assess voiding habits  [x]  Assess bowel habits  []  Other:  Toileting with change colon position in 3D with breathing  Rationale: improve coordination, improve balance, and increase proprioception  to improve the patients ability to manage prolapse symptoms and have less tailbone pain for castro participation in ADLs      25 min Neuromuscular Re-education:  []  See flow sheet :   []  Pelvic floor strengthening                 [x]  Pelvic floor downtraining  []  Quality pelvic floor contractions       [x]  Relaxation techniques  []  Urge suppression exercises  []  Other: PPT and pressure management with body positions  Rationale: improve coordination, improve balance, and increase proprioception  to improve the patients ability to manage prolapse symptoms for all ADLs and childcare    10 min Self Care: Education on pressure management, time and approach to retrain body habits neuromuscular vs. Muscle physiology   Rationale:    increase ROM and increase strength to improve the patients ability to manage symptoms and recognize long-term managements         With   [] TE   [] TA   [] neuro  [] manual  [] self care   [] other: Patient Education: [x] Review HEP    [] Progressed/Changed HEP based on:   [] positioning   [] body mechanics   [] transfers   [] heat/ice application    [] other:      Other Objective/Functional Measures: see goals      Pain Level (0-10 scale) post treatment: 0    ASSESSMENT/Changes in Function: Patient has completed 5 Physical Therapy visits for c/o urinary retention/decreased urinary urges, difficulty with defecation, perineal/pelvic pain, and tailbone pain which is limiting ability function at previous level. Patient has perinal pain complaints with long periods of standing/walking and pelvic pain with sexual intercourse. She has been diagnosed with pelvic organ prolapse (potentially bladder prolapse and rectocele, per patient) and was not adherent to pessary. Patient reports her tailbone bone symptoms and prolapse symptoms have improved over last few weeks likely due to change in scheule from holidays and decrease in performance of activities that contribute to symptoms. [place] does report improvement in urinary urgency and bowel/ constipation management to restore PLOF. Patient continues to demonstrate deficits in voiding urges, constipation, and pelvic pain/pressure, all of which are improving. Patient is making progress towards goals. Patient will benefit from continued skilled Physical Therapy intervention to address remaining deficits and achieve goals to maximize function. Patient was educated on shifting hips and pelvic in 3D planes during bowel movements to assist in evacation, as well as practice with posterior mediastinal breathing.  Continued with exercises to improve lumbopelvic position, complicated by back pain and weak abdominals, to assist in pressure management and prolapse symptoms. HEP modified. Patient will continue to benefit from skilled PT services to modify and progress therapeutic interventions, address functional mobility deficits, address ROM deficits, address strength deficits, analyze and address soft tissue restrictions, analyze and cue movement patterns, analyze and modify body mechanics/ergonomics, assess and modify postural abnormalities, address imbalance/dizziness, and instruct in home and community integration to attain remaining goals. [x]  See Plan of Care  [x]  See progress note/recertification  []  See Discharge Summary         Progress towards goals / Updated goals:  Short term goals: To be achieved in 8 weeks:  1) Urinary: Patient will demonstrate proper fluid habits and normal voiding strategies that promote bladder health to aid in management of normalizing urinary urges and decrease risk of UTIs/ complications of bladder retention. Status at eval: Patient consuming 80 oz water daily. Reports no urinary urges until seated on toilet and voiding, voiding about every 3 hours without urge because she thinks she should. PN Status 12/1/22: patient reports that she is working on voiding every 2.5 hours progressing  Current: 1/10/23: continues to work on a voiding habits, but schedule was off during last weeks due to holidays     2) Bowel: Pt will demonstrate proper toileting posture/techniques for improved bowel motility to improve QOL and prevent weakening of PFM due to straining with BMs.   Status at eval: BM every day, Type 3-4, standard toileting posture with feet on stool, pain with BM and splinting every 1/7 days  PN Status 12/1/22: pt reports no change thus far   Current: 1/10/23: patient reports is depends on what she eats, has decreased amount of straining- still uses splinting, educated on shifting in 3D planes to assist in evacuation and with breathing     3) Pain Patient will be able to use management tools/exercises to reduce pelvic pain/pressure following walk/exercise/childcare to less than VAS 3/10  Status at eval: being on feet and during transitions cause pelvic pain and tailbone pain 0-7/10  PN Status 12/13/22: progressed HEP and education to address postural impairments contributing to pain progressing  Current: 1/10/23: tailbone pain has been feeling better due to  change in routine over holidays     Long term goals: To be achieved in 16 weeks:  1) Urinary: Patient will report appropriate urinary urges every 3-4 hours with strong stream of 8-12 seconds on voiding based on fluid intake. Status at eval: Reports no urinary urges until seated on toilet and voiding, voiding about every 3 hours without urge because she thinks she should. PN Status 12/1/22: patient reports that she is working on voiding every 2.5 hours progressing  Current: 1/10/23: continues to work on a voiding habits, but schedule was off during last weeks due to holidays     2) Pain: Patient will report ability to have sexual intercourse with  without limitation of positions from pain for emotional health and wellbeing. Status at eval: Patient reports modified sexual intercourse positions due to pelvic pain  PN Status 12/1/22: not addressed today  PN Status 1/10/23: not addressed today     3) Pain: Patient will report 0/10 tailbone pain with sit to stand transfers over 2-3 weeks for improved participation in childcare.               Status at eval: tailbone pain 0-7/10 during transitional movements lasting a few days at a time about 3 episodes per month  PN Status 12/1/22: progressed HEP to address postural impairments contributing to pain progressing  Current: 1/10/23: tailbone pain has been feeling better due to  change in routine over holidays     4) Bowel: Patient will demonstrate proper pelvic floor and diaphragm coordination to aide in proper evacuation of stool without pain or splinting  Status at eval: Patient has pain and requires splinting 1/7 days  PN Status 12/1/22: pt denies changes in these sxs thus far  Current: 1/10/23: patient educates on strategies this session     5) Pain: Patient will tolerate advanced ADLs including social, childcare, and sporting activities with pelvic pain/pressure no more than 1/10  Status at eval: 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  PN Status 12/1/22: pt denies changes in these sxs thus far  Current 1/10/23: patient's schedule has bene off due to holidays     6) Patient will demonstrate improvement of current complaints evidenced by a 9/4 point  (Bowel/ Urinary Problem) improvement in FOTO,   Status at [facility]: (Bowel/ Urinary Problem) : 52/79  PN Status 12/1/22: will reassess at 5th visit  Current 1/10/23: will re-assess next visit     7) Patient will demonstrate independence with management tools and exercise program that are beneficial for current condition in order to feel comfortable with Pelvic floor PT D/C and not fear exacerbation of current condition or symptoms returning. Status at eval : patient unsure of independent symptom management- no longer using pessary  PN Status 12/1/22: progressed HEP and patient education to improve understanding of symptom management progressing  Current 1/10/23: progressed HEP and patient education to improve understanding of symptom management progressing     NEW GOAL: to be completed with long term goals in 16 weeks from start of care:              Patient's PERF score will improve to 4/10/8//10 at least for ability ot participate in all childcare and extracurricular activities.               Status on 11/17/22: PERF (Performance/Endurance/Repetitions//Flicks): 2/70/2//8              PN Status 12/1/22: not reassessed today; progressed exercises to reduce PFM tone progressing   Current 1/10/23: patient reports increased awareness and easier relaxation of pelvic floor muscles    PLAN  []  Upgrade activities as tolerated     [x]  Continue plan of care  []  Update interventions per flow sheet       []  Discharge due to:_  []  Other:_      Rissa Paredes PT 1/10/2023  9:17 AM    Future Appointments   Date Time Provider Adolfo Quinones   1/10/2023 10:30 AM Jim La PT [facility]   1/17/2023 11:30 AM MATTHEW Villarreal [facility]   1/24/2023 11:30 AM MATTHEW Villarreal [facility]   1/31/2023 11:30 AM MATTHEW Villarreal [facility]

## 2023-01-16 ENCOUNTER — TELEPHONE (OUTPATIENT)
Dept: PHYSICAL THERAPY | Age: 38
End: 2023-01-16

## 2023-01-17 ENCOUNTER — APPOINTMENT (OUTPATIENT)
Dept: PHYSICAL THERAPY | Age: 38
End: 2023-01-17

## 2023-01-17 ENCOUNTER — APPOINTMENT (OUTPATIENT)
Dept: PHYSICAL THERAPY | Age: 38
End: 2023-01-17
Payer: OTHER GOVERNMENT

## 2023-01-24 ENCOUNTER — APPOINTMENT (OUTPATIENT)
Dept: PHYSICAL THERAPY | Age: 38
End: 2023-01-24
Payer: OTHER GOVERNMENT

## 2023-01-24 ENCOUNTER — TELEPHONE (OUTPATIENT)
Dept: PHYSICAL THERAPY | Age: 38
End: 2023-01-24

## 2023-01-24 ENCOUNTER — APPOINTMENT (OUTPATIENT)
Dept: PHYSICAL THERAPY | Age: 38
End: 2023-01-24

## 2023-01-31 ENCOUNTER — TELEPHONE (OUTPATIENT)
Dept: PHYSICAL THERAPY | Age: 38
End: 2023-01-31

## 2023-01-31 ENCOUNTER — APPOINTMENT (OUTPATIENT)
Dept: PHYSICAL THERAPY | Age: 38
End: 2023-01-31

## 2023-02-07 ENCOUNTER — APPOINTMENT (OUTPATIENT)
Dept: PHYSICAL THERAPY | Age: 38
End: 2023-02-07
Payer: OTHER GOVERNMENT

## 2023-02-14 ENCOUNTER — APPOINTMENT (OUTPATIENT)
Dept: PHYSICAL THERAPY | Age: 38
End: 2023-02-14
Payer: OTHER GOVERNMENT

## 2023-02-21 ENCOUNTER — APPOINTMENT (OUTPATIENT)
Dept: PHYSICAL THERAPY | Age: 38
End: 2023-02-21
Payer: OTHER GOVERNMENT